# Patient Record
Sex: MALE | Race: WHITE | NOT HISPANIC OR LATINO | Employment: OTHER | URBAN - METROPOLITAN AREA
[De-identification: names, ages, dates, MRNs, and addresses within clinical notes are randomized per-mention and may not be internally consistent; named-entity substitution may affect disease eponyms.]

---

## 2018-03-26 ENCOUNTER — OFFICE VISIT CONVERTED (OUTPATIENT)
Dept: FAMILY MEDICINE CLINIC | Facility: CLINIC | Age: 68
End: 2018-03-26
Attending: FAMILY MEDICINE

## 2018-09-25 ENCOUNTER — OFFICE VISIT CONVERTED (OUTPATIENT)
Dept: FAMILY MEDICINE CLINIC | Facility: CLINIC | Age: 68
End: 2018-09-25
Attending: FAMILY MEDICINE

## 2019-04-30 ENCOUNTER — OFFICE VISIT CONVERTED (OUTPATIENT)
Dept: FAMILY MEDICINE CLINIC | Facility: CLINIC | Age: 69
End: 2019-04-30
Attending: FAMILY MEDICINE

## 2019-04-30 ENCOUNTER — HOSPITAL ENCOUNTER (OUTPATIENT)
Dept: LAB | Facility: HOSPITAL | Age: 69
Discharge: HOME | End: 2019-04-30
Attending: FAMILY MEDICINE

## 2019-04-30 LAB
ANION GAP SERPL CALC-SCNC: 20 MMOL/L (ref 8–19)
BUN SERPL-MCNC: 22 MG/DL (ref 5–25)
BUN/CREAT SERPL: 22 {RATIO} (ref 6–20)
CALCIUM SERPL-MCNC: 9.5 MG/DL (ref 8.7–10.4)
CHLORIDE SERPL-SCNC: 102 MMOL/L (ref 99–111)
CHOLEST SERPL-MCNC: 182 MG/DL (ref 107–200)
CHOLEST/HDLC SERPL: 4.3 {RATIO} (ref 3–6)
CONV CO2: 23 MMOL/L (ref 22–32)
CREAT UR-MCNC: 1.01 MG/DL (ref 0.7–1.2)
GFR SERPLBLD BASED ON 1.73 SQ M-ARVRAT: >60 ML/MIN/{1.73_M2}
GLUCOSE SERPL-MCNC: 112 MG/DL (ref 70–99)
HDLC SERPL-MCNC: 42 MG/DL (ref 40–60)
LDLC SERPL CALC-MCNC: 114 MG/DL (ref 70–100)
OSMOLALITY SERPL CALC.SUM OF ELEC: 296 MOSM/KG (ref 273–304)
POTASSIUM SERPL-SCNC: 4.3 MMOL/L (ref 3.5–5.3)
SODIUM SERPL-SCNC: 141 MMOL/L (ref 135–147)
TRIGL SERPL-MCNC: 131 MG/DL (ref 40–150)
URATE SERPL-MCNC: 7.1 MG/DL (ref 3.5–8.5)
VLDLC SERPL-MCNC: 26 MG/DL (ref 5–37)

## 2019-05-24 ENCOUNTER — OFFICE VISIT CONVERTED (OUTPATIENT)
Dept: ORTHOPEDIC SURGERY | Facility: CLINIC | Age: 69
End: 2019-05-24
Attending: ORTHOPAEDIC SURGERY

## 2019-09-30 ENCOUNTER — OFFICE VISIT CONVERTED (OUTPATIENT)
Dept: FAMILY MEDICINE CLINIC | Facility: CLINIC | Age: 69
End: 2019-09-30
Attending: FAMILY MEDICINE

## 2019-09-30 ENCOUNTER — HOSPITAL ENCOUNTER (OUTPATIENT)
Dept: LAB | Facility: HOSPITAL | Age: 69
Discharge: HOME | End: 2019-09-30
Attending: FAMILY MEDICINE

## 2019-09-30 LAB
ANION GAP SERPL CALC-SCNC: 26 MMOL/L (ref 8–19)
BUN SERPL-MCNC: 15 MG/DL (ref 5–25)
BUN/CREAT SERPL: 15 {RATIO} (ref 6–20)
CALCIUM SERPL-MCNC: 10.2 MG/DL (ref 8.7–10.4)
CHLORIDE SERPL-SCNC: 101 MMOL/L (ref 99–111)
CHOLEST SERPL-MCNC: 202 MG/DL (ref 107–200)
CHOLEST/HDLC SERPL: 4.2 {RATIO} (ref 3–6)
CONV CO2: 19 MMOL/L (ref 22–32)
CREAT UR-MCNC: 1 MG/DL (ref 0.7–1.2)
GFR SERPLBLD BASED ON 1.73 SQ M-ARVRAT: >60 ML/MIN/{1.73_M2}
GLUCOSE SERPL-MCNC: 86 MG/DL (ref 70–99)
HDLC SERPL-MCNC: 48 MG/DL (ref 40–60)
LDLC SERPL CALC-MCNC: 127 MG/DL (ref 70–100)
OSMOLALITY SERPL CALC.SUM OF ELEC: 294 MOSM/KG (ref 273–304)
POTASSIUM SERPL-SCNC: 4.3 MMOL/L (ref 3.5–5.3)
PSA SERPL-MCNC: 0.83 NG/ML (ref 0–4)
SODIUM SERPL-SCNC: 142 MMOL/L (ref 135–147)
TRIGL SERPL-MCNC: 136 MG/DL (ref 40–150)
VLDLC SERPL-MCNC: 27 MG/DL (ref 5–37)

## 2020-04-21 ENCOUNTER — TELEMEDICINE CONVERTED (OUTPATIENT)
Dept: FAMILY MEDICINE CLINIC | Facility: CLINIC | Age: 70
End: 2020-04-21
Attending: INTERNAL MEDICINE

## 2020-05-19 ENCOUNTER — HOSPITAL ENCOUNTER (OUTPATIENT)
Dept: LAB | Facility: HOSPITAL | Age: 70
Discharge: HOME | End: 2020-05-19
Attending: INTERNAL MEDICINE

## 2020-05-19 LAB
ALBUMIN SERPL-MCNC: 4.5 G/DL (ref 3.5–5)
ALBUMIN/GLOB SERPL: 1.9 {RATIO} (ref 1.4–2.6)
ALP SERPL-CCNC: 57 U/L (ref 56–155)
ALT SERPL-CCNC: 26 U/L (ref 10–40)
ANION GAP SERPL CALC-SCNC: 20 MMOL/L (ref 8–19)
AST SERPL-CCNC: 20 U/L (ref 15–50)
BASOPHILS # BLD AUTO: 0.05 10*3/UL (ref 0–0.2)
BASOPHILS NFR BLD AUTO: 0.6 % (ref 0–3)
BILIRUB SERPL-MCNC: 0.48 MG/DL (ref 0.2–1.3)
BUN SERPL-MCNC: 15 MG/DL (ref 5–25)
BUN/CREAT SERPL: 19 {RATIO} (ref 6–20)
CALCIUM SERPL-MCNC: 9.7 MG/DL (ref 8.7–10.4)
CHLORIDE SERPL-SCNC: 101 MMOL/L (ref 99–111)
CHOLEST SERPL-MCNC: 182 MG/DL (ref 107–200)
CHOLEST/HDLC SERPL: 4.6 {RATIO} (ref 3–6)
CONV ABS IMM GRAN: 0.05 10*3/UL (ref 0–0.2)
CONV CO2: 23 MMOL/L (ref 22–32)
CONV IMMATURE GRAN: 0.6 % (ref 0–1.8)
CONV TOTAL PROTEIN: 6.9 G/DL (ref 6.3–8.2)
CREAT UR-MCNC: 0.78 MG/DL (ref 0.7–1.2)
DEPRECATED RDW RBC AUTO: 42.1 FL (ref 35.1–43.9)
EOSINOPHIL # BLD AUTO: 0.07 10*3/UL (ref 0–0.7)
EOSINOPHIL # BLD AUTO: 0.9 % (ref 0–7)
ERYTHROCYTE [DISTWIDTH] IN BLOOD BY AUTOMATED COUNT: 13.1 % (ref 11.6–14.4)
GFR SERPLBLD BASED ON 1.73 SQ M-ARVRAT: >60 ML/MIN/{1.73_M2}
GLOBULIN UR ELPH-MCNC: 2.4 G/DL (ref 2–3.5)
GLUCOSE SERPL-MCNC: 96 MG/DL (ref 70–99)
HCT VFR BLD AUTO: 44.2 % (ref 42–52)
HDLC SERPL-MCNC: 40 MG/DL (ref 40–60)
HGB BLD-MCNC: 14.6 G/DL (ref 14–18)
LDLC SERPL CALC-MCNC: 110 MG/DL (ref 70–100)
LYMPHOCYTES # BLD AUTO: 2.03 10*3/UL (ref 1–5)
LYMPHOCYTES NFR BLD AUTO: 24.7 % (ref 20–45)
MCH RBC QN AUTO: 29.1 PG (ref 27–31)
MCHC RBC AUTO-ENTMCNC: 33 G/DL (ref 33–37)
MCV RBC AUTO: 88 FL (ref 80–96)
MONOCYTES # BLD AUTO: 0.76 10*3/UL (ref 0.2–1.2)
MONOCYTES NFR BLD AUTO: 9.3 % (ref 3–10)
NEUTROPHILS # BLD AUTO: 5.25 10*3/UL (ref 2–8)
NEUTROPHILS NFR BLD AUTO: 63.9 % (ref 30–85)
NRBC CBCN: 0 % (ref 0–0.7)
OSMOLALITY SERPL CALC.SUM OF ELEC: 289 MOSM/KG (ref 273–304)
PLATELET # BLD AUTO: 238 10*3/UL (ref 130–400)
PMV BLD AUTO: 11.5 FL (ref 9.4–12.4)
POTASSIUM SERPL-SCNC: 4.5 MMOL/L (ref 3.5–5.3)
PSA SERPL-MCNC: 0.6 NG/ML (ref 0–4)
RBC # BLD AUTO: 5.02 10*6/UL (ref 4.7–6.1)
SODIUM SERPL-SCNC: 139 MMOL/L (ref 135–147)
TRIGL SERPL-MCNC: 159 MG/DL (ref 40–150)
VLDLC SERPL-MCNC: 32 MG/DL (ref 5–37)
WBC # BLD AUTO: 8.21 10*3/UL (ref 4.8–10.8)

## 2020-05-27 ENCOUNTER — OFFICE VISIT CONVERTED (OUTPATIENT)
Dept: SURGERY | Facility: CLINIC | Age: 70
End: 2020-05-27
Attending: NURSE PRACTITIONER

## 2020-05-27 ENCOUNTER — CONVERSION ENCOUNTER (OUTPATIENT)
Dept: SURGERY | Facility: CLINIC | Age: 70
End: 2020-05-27

## 2020-09-08 ENCOUNTER — HOSPITAL ENCOUNTER (OUTPATIENT)
Dept: PREADMISSION TESTING | Facility: HOSPITAL | Age: 70
Discharge: HOME | End: 2020-09-08
Attending: SURGERY

## 2020-09-10 LAB — SARS-COV-2 RNA SPEC QL NAA+PROBE: NOT DETECTED

## 2020-09-11 ENCOUNTER — HOSPITAL ENCOUNTER (OUTPATIENT)
Dept: GASTROENTEROLOGY | Facility: HOSPITAL | Age: 70
Setting detail: HOSPITAL OUTPATIENT SURGERY
Discharge: HOME | End: 2020-09-11
Attending: SURGERY

## 2020-09-24 ENCOUNTER — OFFICE VISIT CONVERTED (OUTPATIENT)
Dept: SURGERY | Facility: CLINIC | Age: 70
End: 2020-09-24
Attending: SURGERY

## 2020-10-21 ENCOUNTER — HOSPITAL ENCOUNTER (OUTPATIENT)
Dept: LAB | Facility: HOSPITAL | Age: 70
Discharge: HOME | End: 2020-10-21
Attending: INTERNAL MEDICINE

## 2020-10-21 ENCOUNTER — CONVERSION ENCOUNTER (OUTPATIENT)
Dept: FAMILY MEDICINE CLINIC | Facility: CLINIC | Age: 70
End: 2020-10-21

## 2020-10-21 ENCOUNTER — OFFICE VISIT CONVERTED (OUTPATIENT)
Dept: FAMILY MEDICINE CLINIC | Facility: CLINIC | Age: 70
End: 2020-10-21
Attending: INTERNAL MEDICINE

## 2020-10-21 LAB
ALBUMIN SERPL-MCNC: 4.6 G/DL (ref 3.5–5)
ALBUMIN/GLOB SERPL: 1.7 {RATIO} (ref 1.4–2.6)
ALP SERPL-CCNC: 60 U/L (ref 56–155)
ALT SERPL-CCNC: 31 U/L (ref 10–40)
ANION GAP SERPL CALC-SCNC: 21 MMOL/L (ref 8–19)
AST SERPL-CCNC: 23 U/L (ref 15–50)
BILIRUB SERPL-MCNC: 0.51 MG/DL (ref 0.2–1.3)
BUN SERPL-MCNC: 17 MG/DL (ref 5–25)
BUN/CREAT SERPL: 19 {RATIO} (ref 6–20)
CALCIUM SERPL-MCNC: 9.9 MG/DL (ref 8.7–10.4)
CHLORIDE SERPL-SCNC: 101 MMOL/L (ref 99–111)
CHOLEST SERPL-MCNC: 187 MG/DL (ref 107–200)
CHOLEST/HDLC SERPL: 4 {RATIO} (ref 3–6)
CONV CO2: 22 MMOL/L (ref 22–32)
CONV TOTAL PROTEIN: 7.3 G/DL (ref 6.3–8.2)
CREAT UR-MCNC: 0.91 MG/DL (ref 0.7–1.2)
EST. AVERAGE GLUCOSE BLD GHB EST-MCNC: 105 MG/DL
GFR SERPLBLD BASED ON 1.73 SQ M-ARVRAT: >60 ML/MIN/{1.73_M2}
GLOBULIN UR ELPH-MCNC: 2.7 G/DL (ref 2–3.5)
GLUCOSE SERPL-MCNC: 90 MG/DL (ref 70–99)
HBA1C MFR BLD: 5.3 % (ref 3.5–5.7)
HDLC SERPL-MCNC: 47 MG/DL (ref 40–60)
LDLC SERPL CALC-MCNC: 111 MG/DL (ref 70–100)
OSMOLALITY SERPL CALC.SUM OF ELEC: 291 MOSM/KG (ref 273–304)
POTASSIUM SERPL-SCNC: 4.4 MMOL/L (ref 3.5–5.3)
PSA SERPL-MCNC: 0.73 NG/ML (ref 0–4)
SODIUM SERPL-SCNC: 140 MMOL/L (ref 135–147)
TRIGL SERPL-MCNC: 147 MG/DL (ref 40–150)
VLDLC SERPL-MCNC: 29 MG/DL (ref 5–37)

## 2021-05-04 ENCOUNTER — HOSPITAL ENCOUNTER (OUTPATIENT)
Dept: LAB | Facility: HOSPITAL | Age: 71
Discharge: HOME | End: 2021-05-04
Attending: PHYSICIAN ASSISTANT

## 2021-05-04 ENCOUNTER — OFFICE VISIT CONVERTED (OUTPATIENT)
Dept: FAMILY MEDICINE CLINIC | Facility: CLINIC | Age: 71
End: 2021-05-04
Attending: PHYSICIAN ASSISTANT

## 2021-05-04 LAB
25(OH)D3 SERPL-MCNC: 45 NG/ML (ref 30–100)
ALBUMIN SERPL-MCNC: 4.5 G/DL (ref 3.5–5)
ALBUMIN/GLOB SERPL: 1.6 {RATIO} (ref 1.4–2.6)
ALP SERPL-CCNC: 54 U/L (ref 56–155)
ALT SERPL-CCNC: 26 U/L (ref 10–40)
ANION GAP SERPL CALC-SCNC: 17 MMOL/L (ref 8–19)
APPEARANCE UR: CLEAR
AST SERPL-CCNC: 24 U/L (ref 15–50)
BASOPHILS # BLD AUTO: 0.05 10*3/UL (ref 0–0.2)
BASOPHILS NFR BLD AUTO: 0.6 % (ref 0–3)
BILIRUB SERPL-MCNC: 0.46 MG/DL (ref 0.2–1.3)
BILIRUB UR QL: NEGATIVE
BUN SERPL-MCNC: 21 MG/DL (ref 5–25)
BUN/CREAT SERPL: 23 {RATIO} (ref 6–20)
CALCIUM SERPL-MCNC: 9.3 MG/DL (ref 8.7–10.4)
CHLORIDE SERPL-SCNC: 100 MMOL/L (ref 99–111)
CHOLEST SERPL-MCNC: 195 MG/DL (ref 107–200)
CHOLEST/HDLC SERPL: 4.4 {RATIO} (ref 3–6)
COLOR UR: YELLOW
CONV ABS IMM GRAN: 0.04 10*3/UL (ref 0–0.2)
CONV CO2: 24 MMOL/L (ref 22–32)
CONV COLLECTION SOURCE (UA): NORMAL
CONV IMMATURE GRAN: 0.5 % (ref 0–1.8)
CONV TOTAL PROTEIN: 7.3 G/DL (ref 6.3–8.2)
CONV UROBILINOGEN IN URINE BY AUTOMATED TEST STRIP: 0.2 {EHRLICHU}/DL (ref 0.1–1)
CREAT UR-MCNC: 0.9 MG/DL (ref 0.7–1.2)
DEPRECATED RDW RBC AUTO: 42.3 FL (ref 35.1–43.9)
EOSINOPHIL # BLD AUTO: 0.12 10*3/UL (ref 0–0.7)
EOSINOPHIL # BLD AUTO: 1.5 % (ref 0–7)
ERYTHROCYTE [DISTWIDTH] IN BLOOD BY AUTOMATED COUNT: 13.4 % (ref 11.6–14.4)
GFR SERPLBLD BASED ON 1.73 SQ M-ARVRAT: >60 ML/MIN/{1.73_M2}
GLOBULIN UR ELPH-MCNC: 2.8 G/DL (ref 2–3.5)
GLUCOSE SERPL-MCNC: 98 MG/DL (ref 70–99)
GLUCOSE UR QL: NEGATIVE MG/DL
HCT VFR BLD AUTO: 43 % (ref 42–52)
HDLC SERPL-MCNC: 44 MG/DL (ref 40–60)
HGB BLD-MCNC: 14.3 G/DL (ref 14–18)
HGB UR QL STRIP: NEGATIVE
KETONES UR QL STRIP: NEGATIVE MG/DL
LDLC SERPL CALC-MCNC: 118 MG/DL (ref 70–100)
LEUKOCYTE ESTERASE UR QL STRIP: NEGATIVE
LYMPHOCYTES # BLD AUTO: 1.9 10*3/UL (ref 1–5)
LYMPHOCYTES NFR BLD AUTO: 23.6 % (ref 20–45)
MCH RBC QN AUTO: 28.8 PG (ref 27–31)
MCHC RBC AUTO-ENTMCNC: 33.3 G/DL (ref 33–37)
MCV RBC AUTO: 86.5 FL (ref 80–96)
MONOCYTES # BLD AUTO: 0.83 10*3/UL (ref 0.2–1.2)
MONOCYTES NFR BLD AUTO: 10.3 % (ref 3–10)
NEUTROPHILS # BLD AUTO: 5.12 10*3/UL (ref 2–8)
NEUTROPHILS NFR BLD AUTO: 63.5 % (ref 30–85)
NITRITE UR QL STRIP: NEGATIVE
NRBC CBCN: 0 % (ref 0–0.7)
OSMOLALITY SERPL CALC.SUM OF ELEC: 287 MOSM/KG (ref 273–304)
PH UR STRIP.AUTO: 7 [PH] (ref 5–8)
PLATELET # BLD AUTO: 223 10*3/UL (ref 130–400)
PMV BLD AUTO: 11.7 FL (ref 9.4–12.4)
POTASSIUM SERPL-SCNC: 4 MMOL/L (ref 3.5–5.3)
PROT UR QL: NEGATIVE MG/DL
RBC # BLD AUTO: 4.97 10*6/UL (ref 4.7–6.1)
SODIUM SERPL-SCNC: 137 MMOL/L (ref 135–147)
SP GR UR: 1.02 (ref 1–1.03)
T4 FREE SERPL-MCNC: 1.2 NG/DL (ref 0.9–1.8)
TRIGL SERPL-MCNC: 167 MG/DL (ref 40–150)
TSH SERPL-ACNC: 1.99 M[IU]/L (ref 0.27–4.2)
URATE SERPL-MCNC: 7.3 MG/DL (ref 3.5–8.5)
VLDLC SERPL-MCNC: 33 MG/DL (ref 5–37)
WBC # BLD AUTO: 8.06 10*3/UL (ref 4.8–10.8)

## 2021-05-10 NOTE — H&P
History and Physical      Patient Name: Jose Raul Traylor   Patient ID: [id number]   Sex: Male   YOB: 1950    Primary Care Provider: Thanh Ford DO   Referring Provider: Burak Schwartz MD    Visit Date: May 27, 2020    Provider: CURTIS Cruz   Location[facility]   Location Address: [address] [place] [address]  [id number]   Location Phone: [phone]          Chief Complaint  · Requesting colonoscopy  · Age 50 or over  · Here today for a pre-surgical colon screening visit  · Personal History of Polyps      History Of Present Illness  The patient is a 69 year old /White male presenting to the [facility] office on a referral from Burak Schwartz MD.   Jose Raul Traylor needs to have a screening colonoscopy.   Patient states that they have had a colonoscopy. 6 years ago   Patient currently complains of: no complaints and   Patient Does not have family history of colon cancer.      Patient presents today on referral from Dr. Manolo Jimenez.  Patient presents today without complaints for screening colonoscopy.  He denies any abdominal pain, diarrhea, or rectal bleeding.  Admits to a history of colonic polyps.  Denies any family history of colorectal cancer.    7/2014 - Colonoscopy ([facility]): Diverticulosis and hemorrhoids.     9/2010 - Colonoscopy ([facility]): @20cm - Serrated hyperplastic; diverticulosis.    12/2002 - Colonoscopy ([facility]): Rectum - Hyperplastic.         Past Medical History  Disease Name Date Onset Notes   Allergic rhinitis, chronic --  --    Allergy, Unspecified --  --    BPH without urinary obstruction --  --    Degenerative disc disease, lumbar 10/05/2016 --    GERD --  --    Gout 09/08/2015 --    High blood pressure --  --    Hypertension --  --    Seasonal allergies --  --    Vitamin D deficiency 09/08/2015 --          Past Surgical History  Procedure Name Date Notes   Colonoscopy 07/2014 Diverticulosis and hemorrhoids.         Medication List  Name Date  Started Instructions   allopurinol 100 mg oral tablet 09/30/2019 TAKE ONE TABLET BY MOUTH DAILY   amlodipine 10 mg oral tablet 09/30/2019 TAKE ONE TABLET BY MOUTH DAILY for 90 days   aspirin 81 mg oral tablet,delayed release (DR/EC)  take 1 tablet (81 mg) by oral route once daily   doxazosin 8 mg oral tablet 09/30/2019 take 1 tablet (8 mg) by oral route once daily for 90 days   hydrochlorothiazide 25 mg oral tablet 09/30/2019 take 1 tablet (25 mg) by oral route once daily   losartan 50 mg oral tablet 09/30/2019 take 1 tablet (50 mg) by oral route once daily   meloxicam 15 mg oral tablet 09/30/2019 take 1 tablet (15 mg) by oral route once daily   omeprazole 20 mg oral capsule,delayed release(DR/EC) 09/30/2019 take 1 capsule (20 mg) by oral route once daily before a meal for 90 days   Singulair 10 mg oral tablet 09/30/2019 TAKE ONE TABLET BY MOUTH EVERY EVENING   Vitamin D3 2,000 unit oral capsule  take 1 capsule by oral route daily   zinc 50 mg oral tablet  --    Zyrtec 10 mg oral tablet 09/30/2019 TAKE ONE TABLET BY MOUTH DAILY         Allergy List  Allergen Name Date Reaction Notes   NO KNOWN DRUG ALLERGIES --  --  --    Molds --  --  --          Family Medical History  Disease Name Relative/Age Notes   Cancer, Unspecified Son/   Son   Renal Calculus Mother/   Mother         Social History  Finding Status Start/Stop Quantity Notes   Active but no formal exercise --  --/-- --  --    Alcohol Current some day --/-- --  04/30/2019 - 22   Alcohol Use Current some day --/-- --  rarely drinks, less than 1 drink per day, has been drinking for 31 or more years   lives with spouse --  --/-- --  --     --  --/-- --  --    . --  --/-- --  --    No known infection risk --  --/-- --  --    Recreational Drug Use Never --/-- --  no   Retired. --  --/-- --  --    Second hand smoke exposure Never 0/0 --  no   Tobacco Former 15/45 1 pack former smoker         Review of Systems  · Constitutional  o Denies  o : fever,  "chills  · Eyes  o Denies  o : yellowish discoloration of eyes  · HENT  o Denies  o : difficulty swallowing  · Cardiovascular  o Denies  o : chest pain, chest pain on exertion  · Respiratory  o Denies  o : shortness of breath  · Gastrointestinal  o Denies  o : nausea, vomiting, diarrhea, constipation  · Genitourinary  o Denies  o : abnormal color of urine  · Integument  o Denies  o : rash  · Neurologic  o Denies  o : tingling or numbness  · Musculoskeletal  o Denies  o : joint pain  · Endocrine  o Denies  o : weight gain, weight loss      Vitals  Date Time BP Position Site L\R Cuff Size HR RR TEMP (F) WT  HT  BMI kg/m2 BSA m2 O2 Sat        05/27/2020 09:07 AM       14  221lbs 6oz 5'  8\" 33.66 2.19           Physical Examination  · Constitutional  o Appearance  o : well developed, well-nourished, patient in no apparent distress  · Head and Face  o Head  o :   § Inspection  § : atraumatic, normocephalic  o Face  o :   § Inspection  § : no facial lesions  · Eyes  o Conjunctivae  o : conjunctivae normal  o Sclerae  o : sclerae white  · Neck  o Inspection/Palpation  o : normal appearance, no masses or tenderness, trachea midline  · Respiratory  o Respiratory Effort  o : breathing unlabored  · Skin and Subcutaneous Tissue  o General Inspection  o : no lesions present, no areas of discoloration, skin turgor normal, texture normal  · Neurologic  o Mental Status Examination  o :   § Orientation  § : grossly oriented to person, place and time  § Attention  § : attention normal, concentration abilities normal  § Fund of Knowledge  § : fund of knowledge within normal limits, patient aware of current events  o Gait and Station  o : normal gait, able to stand without difficulty  · Psychiatric  o Judgement and Insight  o : judgment and insight intact  o Mood and Affect  o : mood normal, affect appropriate     Abdomen: Round, soft, nondistended, nontender, no guarding, no rebound tenderness noted.           Assessment  · Personal " history of colonic polyps     V12.72/Z86.010  · Screening for colon cancer     V76.51/Z12.11  · Pre-op testing     V72.84/Z01.818      Plan  · Orders  o Consent for Colonoscopy Screening-Possible risk/complications, benefits, and alternatives to surgical or invasive procedure have been explained to patient and/or legal guardian. -Patient has been evaluated and can tolerate anesthesia and/or sedation. Risks, benefits, and alternatives to anesthesia and sedation have been explained to patient and/or legal guardian. () - V12.72/Z86.010, V76.51/Z12.11 - 09/11/2020  o [facility] Pre-Op Covid-19 Screening (46646) - V12.72/Z86.010, V76.51/Z12.11, V72.84/Z01.818 - 09/08/2020  · Medications  o Medications have been Reconciled  o Transition of Care or Provider Policy  · Instructions  o Surgical Facility: [facility]  o Handouts Provided Pre-Procedure Instructions including date, time, and location of procedure.   o PLAN: Proceeed with colonoscopy. Patient understands risks/benefits and is willing to proceed.   o ***Surgical Orders***  o RISK AND BENEFITS:  o Given these options, the patient has verbally expressed an understanding of the risks of the surgery and finds these risks acceptable. Will proceed with surgery as soon as possible.  o O.R. PREP: Per protocol   o IV: Per Anesthesia  o Please sign permit for: Colonoscopy with possible biopsies by Dr. Chairez.  o The above History and Physical Examination has been completed within 30 days of admission.  o ***Patient Status***  o Outpatient  o Follow up in the in the office post procedure.  o Electronically Identified Patient Education Materials Provided Electronically            Electronically Signed by: CURTIS Cruz -Author on May 27, 2020 01:17:01 PM

## 2021-05-12 NOTE — PROGRESS NOTES
Progress Note      Patient Name: Jose Raul Traylor   Patient ID: [id number]   Sex: Male   YOB: 1950    Primary Care Provider: Thanh Ford DO    Visit Date: April 21, 2020    Provider: Thanh Ford DO   Location: [facility]   Location Address: [address] [place] [address], [address]  [place], [place]  [id number]   Location Phone: [phone]          Chief Complaint  · Follow up      History Of Present Illness  Video Conferencing Visit  Jose Raul Traylor is a 69 year old /White male who is presenting for evaluation via video conferencing. Verbal consent obtained before beginning visit.   The following staff were present during this visit: Nely Green/ Thanh Ford DO      Follow up     Pt states that his BS has been running around . Pt states that he tried to take Vitamin C but it wasn't doing any thing for his BS so he stopped.    Pt states that he has fallen about 4 weeks ago, he missed a step coming back from [place] with his boat. Pt states that he did not go to the hospital or anything. He states that he hurt his self pretty bad from knee to waist. Pt states that the first week he put ice on it and the second week he put heat on it.    Pt also states that his BP has been around 138/65, 130/67 and his heartrate around high 40s and 50s.    No medication refills at this time.       Past Medical History  Disease Name Date Onset Notes   Allergic rhinitis, chronic --  --    Allergy, Unspecified --  --    BPH without urinary obstruction --  --    Degenerative disc disease, lumbar 10/05/2016 --    GERD --  --    Gout 09/08/2015 --    High blood pressure --  --    Hypertension --  --    Seasonal allergies --  --    Vitamin D deficiency 09/08/2015 --          Past Surgical History  Procedure Name Date Notes   Colonoscopy 07/2014 Diverticulosis and hemorrhoids.         Medication List  Name Date Started Instructions   allopurinol 100 mg oral tablet 09/30/2019 TAKE ONE TABLET BY MOUTH DAILY    amlodipine 10 mg oral tablet 09/30/2019 TAKE ONE TABLET BY MOUTH DAILY for 90 days   aspirin 81 mg oral tablet,delayed release (DR/EC)  take 1 tablet (81 mg) by oral route once daily   doxazosin 8 mg oral tablet 09/30/2019 take 1 tablet (8 mg) by oral route once daily for 90 days   hydrochlorothiazide 25 mg oral tablet 09/30/2019 take 1 tablet (25 mg) by oral route once daily   losartan 50 mg oral tablet 09/30/2019 take 1 tablet (50 mg) by oral route once daily   meloxicam 15 mg oral tablet 09/30/2019 take 1 tablet (15 mg) by oral route once daily   omeprazole 20 mg oral capsule,delayed release(DR/EC) 09/30/2019 take 1 capsule (20 mg) by oral route once daily before a meal for 90 days   Singulair 10 mg oral tablet 09/30/2019 TAKE ONE TABLET BY MOUTH EVERY EVENING   Vitamin D3 2,000 unit oral capsule  take 1 capsule by oral route daily   Zyrtec 10 mg oral tablet 09/30/2019 TAKE ONE TABLET BY MOUTH DAILY         Allergy List  Allergen Name Date Reaction Notes   NO KNOWN DRUG ALLERGIES --  --  --    Molds --  --  --          Family Medical History  Disease Name Relative/Age Notes   Cancer, Unspecified Son/   Son   Renal Calculus Mother/   Mother         Social History  Finding Status Start/Stop Quantity Notes   Active but no formal exercise --  --/-- --  --    Alcohol Current some day --/-- --  04/30/2019 - 22   Alcohol Use Current some day --/-- --  rarely drinks, less than 1 drink per day, has been drinking for 31 or more years   lives with spouse --  --/-- --  --     --  --/-- --  --    . --  --/-- --  --    No known infection risk --  --/-- --  --    Recreational Drug Use Never --/-- --  no   Retired. --  --/-- --  --    Second hand smoke exposure Never 0/0 --  no   Tobacco Former 15/45 1 pack former smoker         Immunizations  NameDate Admin Mfg Trade Name Lot Number Route Inj VIS Given VIS Publication   Xxskmnqjy14/23/2019 [facility] FLUZONE-HIGH DOSE [id number] IM RD 10/23/2019    Comments: tolerated  well advised to wait for 15 minutes after injection   Fvkmeydxg79/03/2018 [facility] Fluzone Quadrivalent [id number] IM RD 10/03/2018 08/07/2015   Comments: Injection given. Pt tolerated well.   Nctnodbmd52/13/2017 [facility] Fluarix, quadrivalent, preservative free [id number] IM RD 10/13/2017 08/07/2015   Comments: tolerated well   Tnldglxph64/05/2016 [facility] Fluarix, quadrivalent, preservative free [id number] IM RD 10/05/2016 08/07/2015   Comments: tolerated well   Ekutzdtob98/20/2015 [facility] Fluarix, quadrivalent, preservative free [id number] IM LD 10/20/2015 08/07/2015   Comments:    Tucqdrkib26/01/2014 [facility] Fluarix, quadrivalent, preservative free [id number] NE NE 09/04/2015 07/02/2012   Comments:    Prevnar 1310/05/2016 [facility] PREVNAR 13 [id number] IM LD 10/05/2016 11/15/2015   Comments: tolerated well   Tdap07/01/2015 [facility] BOOSTRIX  NE NE 09/04/2015 01/24/2012   Comments:          Review of Systems  · Constitutional  o Denies  o : fatigue, night sweats  · Eyes  o Denies  o : double vision, blurred vision  · HENT  o Denies  o : vertigo, recent head injury  · Cardiovascular  o Denies  o : chest pain, irregular heart beats  · Respiratory  o Denies  o : shortness of breath, productive cough  · Gastrointestinal  o Denies  o : nausea, vomiting  · Genitourinary  o Denies  o : dysuria, urinary retention  · Integument  o Denies  o : hair growth change, new skin lesions  · Neurologic  o Denies  o : altered mental status, seizures  · Musculoskeletal  o Admits  o : joint pain, hip pain, knee pain, fall four weeks ago  o Denies  o : joint swelling, limitation of motion  · Endocrine  o Denies  o : cold intolerance, heat intolerance  · Psychiatric  o Denies  o : anxiety, depression  · Heme-Lymph  o Denies  o : petechiae, lymph node enlargement or tenderness  · Allergic-Immunologic  o Denies  o : frequent illnesses      Physical Examination  · Constitutional  o Appearance  o : alert, oriented, in no acute distress, well developed, well-nourished  · Eyes  o Vision  o :  Conjuntivae: Normal, Sclerae white, Pupils: PERRL, Cornea: Clear, no lesions bilateral  · Ears, Nose, Mouth and Throat  o Ears  o : Ext. Ears: Normal shape, Non tender, EACs: Normal , Tragus intact bilaterally, Hearing: intact to conversational voice bilaterally  o Nose  o : No nasal discharge, Mucosa: normal, Septum: midline  o Throat  o : Oropharynx: no inflmation or lesions, no purulence or drainage  · Neck  o Inspection/Palpation  o : no masses or tenderness, no deformities, Trachea: Midline, ROM: with in normal limits, no neck stiffness  o Thyroid  o : no thyromegaly  · Respiratory  o Auscultation of Lungs  o : normal RR, no labored breathing  · Skin and Subcutaneous Tissue  o General Inspection  o : no rashes on visible skin, normal skin color, warm and dry  o Digits and Nails  o : no clubbing, cyanosis, deformities or edema present, normal appearing nails  · Neurologic  o Mental Status Examination  o : alert and oriented to time, place, and person. Gait and Station: normal gait, able to stand without difficulty. CN 2-12 grossly intact   · Psychiatric  o Judgment and Insight  o : judgment and insight intact  o Mood and Affect  o : normal mood and affect          Assessment  · Degenerative disc disease, lumbar     722.52/M51.36  · Gout     274.9/M10.9  · Allergic rhinitis due to allergen     477.9/J30.9  · High blood pressure     401.9/I10  Patient blood pressure is stable at this time. No changes to current regiment. Okay with him living in 130s systolic but recommended him to let me know if systolic bp starts running in 140s routinely. Patient HR stable but low. Patient asymptomatic from bradycardia. Check CMP and Lipid Panel  · Gastroesophageal reflux disease without esophagitis     530.81/K21.9  · BPH without urinary obstruction     600.00/N40.0  Check PSA  · Fall, initial encounter     E888.9/W19.XXXA  Patient fell four weeks ago but states this week has been able to walk without cane and hip pain and knee  pain are improving. If symptoms worsen, patient will tell us and will pursue imaging.  · Bradycardia     427.89/R00.1  · Obesity (BMI 30-39.9)     278.00/E66.9  · Mixed hyperlipidemia     272.2/E78.2    Problems Reconciled  Plan  · Orders  o ACO-14: Influenza immunization administered or previously received () - - 04/21/2020  o ACO - Advance Care Plan or Surrogate Decision Maker documented in EMR (1123F) - - 04/21/2020  o CMP [facility] ([id number]) - 401.9/I10 - 06/21/2020  o Lipid Panel [facility] ([id number]) - 401.9/I10 - 06/21/2020  o PSA Screening, Ultrasensitive, MEDICARE [facility] () - 600.00/N40.0 - 06/21/2020  o CBC with Auto Diff [facility] ([id number]) - 401.9/I10 - 06/21/2020  · Instructions  o Call or return if new symptoms develop or current ones worsen  o Counseled on proper dieting and weight loss  o Continue to monitor blood pressure  o Take medications as prescribed  o If symptoms of fall worsen, call to let us know so we can do imaging.  o More than 50% of encounter was spent face to face with patient  · Disposition  o Follow up in six months            Electronically Signed by: Thanh Ford DO -Author on April 21, 2020 08:48:12 AM

## 2021-05-13 NOTE — PROGRESS NOTES
Progress Note      Patient Name: Jose Raul Traylor   Patient ID: [id number]   Sex: Male   YOB: 1950    Primary Care Provider: Thanh Ford DO   Referring Provider: Burak Schwartz MD    Visit Date: October 21, 2020    Provider: Thanh Ford DO   Location: [facility] [place]   Location Address: [address] [place] [address], [address]  [place], [place]  [id number]   Location Phone: [phone]          Chief Complaint  · 6 month f/u      History Of Present Illness  Jose Raul Traylor is a 70 year old /White male who presents for evaluation and treatment of:      Pt is here for 6 month f/u.    Pt states that he has no concerns at this time. Patient is tolerating all medications well with no complications. Patient is getting ready to go to [place] for the winter. His parents still live there. His grandkids live in [place].       Past Medical History  Disease Name Date Onset Notes   Allergic rhinitis, chronic --  --    Allergy, Unspecified --  --    BPH without urinary obstruction --  --    Degenerative disc disease, lumbar 10/05/2016 --    GERD --  --    Gout 09/08/2015 --    High blood pressure --  --    Hypertension --  --    Seasonal allergies --  --    Vitamin D deficiency 09/08/2015 --          Past Surgical History  Procedure Name Date Notes   Colonoscopy 07/2014 Diverticulosis and hemorrhoids.         Medication List  Name Date Started Instructions   allopurinol 100 mg oral tablet 09/30/2019 TAKE ONE TABLET BY MOUTH DAILY   amlodipine 10 mg oral tablet 09/30/2019 TAKE ONE TABLET BY MOUTH DAILY for 90 days   aspirin 81 mg oral tablet,delayed release (DR/EC)  take 1 tablet (81 mg) by oral route once daily   doxazosin 8 mg oral tablet 09/30/2019 take 1 tablet (8 mg) by oral route once daily for 90 days   hydrochlorothiazide 25 mg oral tablet 09/30/2019 take 1 tablet (25 mg) by oral route once daily   losartan 50 mg oral tablet 09/30/2019 take 1 tablet (50 mg) by oral route once daily    meloxicam 15 mg oral tablet 09/30/2019 take 1 tablet (15 mg) by oral route once daily   omeprazole 20 mg oral capsule,delayed release(DR/EC) 09/30/2019 take 1 capsule (20 mg) by oral route once daily before a meal for 90 days   Singulair 10 mg oral tablet 09/30/2019 TAKE ONE TABLET BY MOUTH EVERY EVENING   Vitamin D3 2,000 unit oral capsule  take 1 capsule by oral route daily   Zyrtec 10 mg oral tablet 09/30/2019 TAKE ONE TABLET BY MOUTH DAILY         Allergy List  Allergen Name Date Reaction Notes   NO KNOWN DRUG ALLERGIES --  --  --    Molds --  --  --          Family Medical History  Disease Name Relative/Age Notes   Cancer, Unspecified Son/   Son   Renal Calculus Mother/   Mother         Social History  Finding Status Start/Stop Quantity Notes   Active but no formal exercise --  --/-- --  --    Alcohol Current some day --/-- --  04/30/2019 - 22   Alcohol Use Current some day --/-- --  rarely drinks, less than 1 drink per day, has been drinking for 31 or more years   lives with spouse --  --/-- --  --     --  --/-- --  --    . --  --/-- --  --    No known infection risk --  --/-- --  --    Recreational Drug Use Never --/-- --  no   Retired. --  --/-- --  --    Second hand smoke exposure Never 0/0 --  no   Tobacco Former 15/45 1 pack former smoker         Immunizations  NameDate Admin Mfg Trade Name Lot Number Route Inj VIS Given VIS Publication   Coagotysq28/23/2019 [facility] FLUZONE-HIGH DOSE [id number] IM RD 10/23/2019    Comments: tolerated well advised to wait for 15 minutes after injection   Prevnar 1310/05/2016 [facility] PREVNAR 13 [id number] IM LD 10/05/2016 11/15/2015   Comments: tolerated well   Tdap07/01/2015 [facility] BOOSTRIX  NE NE 09/04/2015 01/24/2012   Comments:          Review of Systems  · Constitutional  o Denies  o : fatigue, night sweats  · Eyes  o Denies  o : double vision, blurred vision  · HENT  o Denies  o : vertigo, recent head injury  · Cardiovascular  o Denies  o : chest pain, irregular heart  "beats  · Respiratory  o Denies  o : shortness of breath, productive cough  · Gastrointestinal  o Denies  o : nausea, vomiting  · Genitourinary  o Denies  o : dysuria, urinary retention  · Integument  o Denies  o : hair growth change, new skin lesions  · Neurologic  o Denies  o : altered mental status, seizures  · Musculoskeletal  o Denies  o : joint swelling, limitation of motion  · Endocrine  o Denies  o : cold intolerance, heat intolerance  · Psychiatric  o Denies  o : anxiety, depression  · Heme-Lymph  o Denies  o : petechiae, lymph node enlargement or tenderness  · Allergic-Immunologic  o Denies  o : frequent illnesses      Vitals  Date Time BP Position Site L\R Cuff Size HR RR TEMP (F) WT  HT  BMI kg/m2 BSA m2 O2 Sat FR L/min FiO2 HC       09/24/2020 03:09 PM       12  221lbs 0oz 5'  8\" 33.6 2.19       10/21/2020 08:08 /60 Sitting    48 - R  97.7 224lbs 8oz 5'  8\" 34.13 2.21 97 %  21%          Physical Examination  · Constitutional  o Appearance  o : alert, oriented, in no acute distress, well developed, well-nourished  · Eyes  o Vision  o : Conjuntivae: Normal, Sclerae white, Pupils: PERRL, Cornea: Clear, no lesions bilateral  · Ears, Nose, Mouth and Throat  o Ears  o : Ext. Ears: Normal shape, Non tender, EACs: Normal , Tragus intact bilaterally, Hearing: intact to conversational voice bilaterally  o Nose  o : No nasal discharge, Mucosa: normal, Septum: midline, Sinuses: Nontender  o Throat  o : Oropharynx: no inflmation or lesions, no purulence or drainage  · Neck  o Inspection/Palpation  o : Supple, no masses or tenderness, no deformities, Trachea: Midline, ROM: with in normal limits, no neck stiffness, no lymphadenopathy  o Thyroid  o : no thyomegaly, no palpabale masses   · Respiratory  o Auscultation of Lungs  o : normal breath sounds throughout, no wheeze, rhonchi, or crackles  · Cardiovascular  o Heart  o : Regular rate and rhythm, Normal S1,S2 , No cardiac murmers, No S3 or S4 gallop or " rubs  · Gastrointestinal  o Abdominal Examination  o : abdomen soft, nontender, non distended, no rigidity, gaurding, rebound tenderness, no ventral hernias present  o Liver and spleen  o : no hepatomegaly present, liver nontender to palpation, spleen not palpable  · Skin and Subcutaneous Tissue  o General Inspection  o : no rashes on visible skin, normal skin color, warm and dry  o Digits and Nails  o : no clubbing, cyanosis, deformities or edema present, normal appearing nails  · Neurologic  o Mental Status Examination  o : alert and oriented to time, place, and person. Gait and Station: normal gait, able to stand without difficulty. CN 2-12 grossly intact   · Psychiatric  o Judgment and Insight  o : judgment and insight intact  o Mood and Affect  o : normal mood and affect          Assessment  · Need for influenza vaccination     V04.81/Z23  · Allergic rhinitis due to allergen     477.9/J30.9  · BPH (benign prostatic hyperplasia)     600.00/N40.0  · Essential hypertension     401.9/I10  · Impaired fasting glucose     790.21/R73.01  · Lumbago     724.2/M54.5  · Gout     274.9/M10.9      Plan  · Orders  o Hgb A1c [facility] (00547) - 790.21/R73.01 - 10/21/2020  o ACO - Advance Care Plan or Surrogate Decision Maker documented in EMR (1123F) - - 10/21/2020  o ACO-39: Current medications updated and reviewed (1159F, ) - - 10/21/2020  o HTN/Lipid Panel (CMP, Lipid) [facility] (04169, 65909) - 401.9/I10 - 11/21/2020  · Medications  o zinc 50 mg oral tablet   SIG: ---   DISP: (0) tablet with 0 refills  Discontinued on 10/21/2020     o Medications have been Reconciled  o Transition of Care or Provider Policy  · Instructions  o Patient advised to monitor blood pressure (B/P) at home and journal readings. Patient informed that a B/P reading at home of more than 130/80 is considered hypertension. For readings greater fyze758/90 or higher patient is advised to follow up in the office with readings for management. Patient advised to  limit sodium intake.  o Patient was educated/instructed on their diagnosis, treatment and medications prior to discharge from the clinic today.  o Patient instructed to seek medical attention urgently for new or worsening symptoms.  o Call the office with any concerns or questions.  o Minutes spent with patient including greater than 50% in Education/Counseling/Care Coordination.  o Time spent with the patient was minutes, more than 50% face to face.  o Chronic conditions reviewed and taken into consideration for today's treatment plan.  o Discussed Covid-19 precautions including, but not limited to, social distancing, avoid touching your face, and hand washing.   · Disposition  o Follow up in six months            Electronically Signed by: Thanh Ford DO -Author on October 21, 2020 08:48:02 AM

## 2021-05-13 NOTE — PROGRESS NOTES
Progress Note      Patient Name: Jose Raul Traylor   Patient ID: [id number]   Sex: Male   YOB: 1950    Primary Care Provider: Thanh Ford DO   Referring Provider: Burak Schwartz MD    Visit Date: September 24, 2020    Provider: Sher Chairez MD   Location: [facility]   Location Address: [address] [place] [address]  [id number]   Location Phone: [phone]          Chief Complaint  · Follow Up Surgery      History Of Present Illness     Mr. Traylor is seen in follow-up status post colonoscopy. He was found to have diverticulosis.       Past Medical History  Allergic rhinitis, chronic; Allergy, Unspecified; BPH without urinary obstruction; Degenerative disc disease, lumbar; GERD; Gout; High blood pressure; Hypertension; Seasonal allergies; Vitamin D deficiency         Past Surgical History  Colonoscopy         Medication List  allopurinol 100 mg oral tablet; amlodipine 10 mg oral tablet; aspirin 81 mg oral tablet,delayed release (DR/EC); doxazosin 8 mg oral tablet; hydrochlorothiazide 25 mg oral tablet; losartan 50 mg oral tablet; meloxicam 15 mg oral tablet; omeprazole 20 mg oral capsule,delayed release(DR/EC); Singulair 10 mg oral tablet; Vitamin D3 2,000 unit oral capsule; zinc 50 mg oral tablet; Zyrtec 10 mg oral tablet         Allergy List  NO KNOWN DRUG ALLERGIES; Molds         Family Medical History  Cancer, Unspecified; Renal Calculus         Social History  Active but no formal exercise; Alcohol (Current some day); Alcohol Use (Current some day); lives with spouse; ; .; No known infection risk; Recreational Drug Use (Never); Retired.; Second hand smoke exposure (Never); Tobacco (Former)         Review of Systems  · Cardiovascular  o Denies  o : chest pain on exertion, shortness of breath, lower extremity swelling  · Respiratory  o Denies  o : wheezing, chronic cough, coughing up blood  · Gastrointestinal  o Denies  o : diarrhea, chronic abdominal pain,  "reflux symptoms      Vitals  Date Time BP Position Site L\R Cuff Size HR RR TEMP (F) WT  HT  BMI kg/m2 BSA m2 O2 Sat FR L/min FiO2 HC       09/24/2020 03:09 PM       12  221lbs 0oz 5'  8\" 33.6 2.19                 Assessment  · Encounter for examination following surgery     V67.00/Z09      Plan     I have recommended a high-fiber diet. He should undergo a follow-up colonoscopy in five years.             Electronically Signed by: Verona Espinoza-, -Author on October 4, 2020 11:18:20 AM  Electronically Co-signed by: Sher Chairez MD -Reviewer on October 5, 2020 12:57:31 PM  "

## 2021-05-13 NOTE — PROGRESS NOTES
Progress Note      Patient Name: Jose Raul Traylor   Patient ID: [id number]   Sex: Male   YOB: 1950    Primary Care Provider: Thanh Ford DO   Referring Provider: Burak Schwartz MD    Visit Date: October 21, 2020    Provider: Thanh Ford DO   Location: [facility]   Location Address: [address] [place] [address], [address]  [place], [place]  [id number]   Location Phone: [phone]          Chief Complaint  · Annual Wellness Exam      History Of Present Illness  The patient is a 70 year old /White male who has come to this office for his Annual Wellness Visit.   His Primary Care Provider is Thanh Ford DO. His comprehensive Care Team list, including suppliers, has been updated on the Facesheet. His medical/family history, height, weight, BMI, and blood pressure have been reviewed and are in the chart. The Health Risk Assessment has been completed and scanned in the chart.   Medications are listed in the medication list.   The active problem list includes: Allergic rhinitis, chronic, Allergy, Unspecified, BPH without urinary obstruction, Degenerative disc disease, lumbar, GERD, Gout, High blood pressure, and Vitamin D deficiency   The patient does not have a history of substance use.   Patient reports his diet is adequate.   The Mini-Cog has been administered and is scanned in chart. The results are negative. His cognitive function is without limitation.   A hearing loss screen was completed today and the result is negative.   Patient does not have any risk factors for depression. Patient completed the PHQ-9 today and it has been scanned in the chart. The total score is 1-4.   The Timed Up and Go screen was administered today and the result is negative.   The Miner Index of [place] in ADLs indicated full function (score of 6).   A Falls Risk Assessment has been completed, including a review of home fall hazards and medication review.   Overall, the patient's  "functional ability is noted by this provider to be within normal limits. His level of safety is noted to be within normal limits. His balance/gait is within normal limits. There has been a fall with injury in the past year. Patient-specific home safety recommendations have been reviewed and a copy has been given to patient.   He denies issues with leaking urine.   There are no additional risk factors identified.   Living Will/Advanced Directive previously executed and scanned in chart.   Personalized health advice was given to the patient and a written health screening schedule was established; see Plan for details.       Vitals  Date Time BP Position Site L\R Cuff Size HR RR TEMP (F) WT  HT  BMI kg/m2 BSA m2 O2 Sat FR L/min FiO2 HC       10/21/2020 08:08 /60 Sitting    48 - R  97.7 224lbs 8oz 5'  8\" 34.13 2.21 97 %  21%    10/21/2020 08:23 /65 Sitting                           Assessment  · Screening for alcoholism     V79.1/Z13.89  · Screening for depression     V79.0/Z13.89  · Screening for prostate cancer     V76.44/Z12.5  · Encounter for Medicare annual wellness exam     V70.0/Z00.00      Plan  · Orders  o Annual alcohol screening using the AUDIT-C tool, 15 minutes [facility] (43519, ) - V79.1/Z13.89 - 10/21/2020  o Brief face-to-face behavioral counseling for alcohol misuse, 15 minutes () - V79.1/Z13.89 - 10/21/2020  o Positive Alcohol Screening () - V79.1/Z13.89 - 10/21/2020  o Annual depression screening using the PHQ-9 tool, 15 minutes (70907, ) - V79.0/Z13.89 - 10/21/2020  o PSA Screening, Ultrasensitive, MEDICARE [facility] () - V76.44/Z12.5 - 10/21/2020  o Falls Risk Assessment Completed (3288F) - V70.0/Z00.00 - 10/21/2020  o Brief hearing screening (written) [facility] () - V70.0/Z00.00 - 10/21/2020  o Annual Wellness Visit-includes a Personalized Prevention Plan of Service (PPS), SUBSEQUENT VISIT (Medicare) () - V70.0/Z00.00 - 10/21/2020  o ACO-13: Fall Risk Screening with no " falls in past year or only one fall without injury in the past year (1101F) - V70.0/Z00.00 - 10/21/2020  o Presence or absence of urinary incontinence assessed (KAYLEN) (1090F) - V70.0/Z00.00 - 10/21/2020  o ACO - Advance Care Plan or Surrogate Decision Maker documented in EMR (1123F) - - 10/21/2020  o ACO-39: Current medications updated and reviewed (, 1159F) - - 10/21/2020  o ACO-19: Colorectal cancer screening results documented and reviewed (3017F) - - 10/21/2020  · Instructions  o Audit-C Questionnaire completed and scanned into the EMR under the designated folder within the patient's documents.  o Audit-C screen 5 or higher which is scored as positive - Brief Intervention and/or referral for specialized treatment  o Depression Screen completed and scanned into the EMR under the designated folder within the patient's documents.  o Today's PHQ-9 result is __2_  o Health Risk Assessment has been reviewed with the patient.  o Written health screening schedule for next 5-10 years was established with patient; information scanned in chart and given/mailed to patient.  o Fall prevention methods discussed and a copy of recommendations given/mailed to patient.            Electronically Signed by: Thanh Ford DO -Author on November 6, 2020 12:15:12 PM

## 2021-05-14 VITALS — RESPIRATION RATE: 12 BRPM | BODY MASS INDEX: 33.49 KG/M2 | HEIGHT: 68 IN | WEIGHT: 221 LBS

## 2021-05-14 VITALS
BODY MASS INDEX: 34.02 KG/M2 | HEART RATE: 48 BPM | WEIGHT: 224.5 LBS | HEIGHT: 68 IN | SYSTOLIC BLOOD PRESSURE: 141 MMHG | OXYGEN SATURATION: 97 % | TEMPERATURE: 97.7 F | DIASTOLIC BLOOD PRESSURE: 60 MMHG

## 2021-05-14 NOTE — PROGRESS NOTES
Progress Note      Patient Name: Jose Raul Traylor   Patient ID: [id number]   Sex: Male   YOB: 1950    Primary Care Provider: Maciej Casas PA-C    Visit Date: May 4, 2021    Provider: Maciej Casas PA-C   Location: [facility] [place]   Location Address: [address] [place] [address], [address]  [place]  [id number]   Location Phone: [phone]          Chief Complaint  · Establish Care/[place] Pt      History Of Present Illness  Jose Raul Traylor is a 70 year old /White male who presents for evaluation and treatment of: establish care      Pt presents today to establish care. He previously saw Dr. Ford.     Pt c/o allergies. He just got back home from [place] for the winter and notes they have been worse this year than normal. Pt notes he has an occasional cough. Pt is taking Zyrtec and Singulair qd.     PMH- GERD, HTN    Labs-10/21/20  Colonoscopy-9/11/20    Pt has received both COVID-19 vaccines in [place], Moderna    HTN - well controlled with losartan HCT, Norvasc  GERD - controlled with omeprazole 20mg    No CP, SOA, HA         Past Medical History  Disease Name Date Onset Notes   Allergic rhinitis, chronic --  --    Allergy, Unspecified --  --    BPH without Urinary Obstruction --  --    Degenerative disc disease, lumbar 10/05/2016 --    GERD --  --    Gout 09/08/2015 --    High blood pressure --  --    Hypertension --  --    Seasonal allergies --  --    Vitamin D deficiency 09/08/2015 --          Past Surgical History  Procedure Name Date Notes   Colonoscopy 07/2014 Diverticulosis and hemorrhoids.         Medication List  Name Date Started Instructions   amlodipine 10 mg oral tablet 09/30/2019 TAKE ONE TABLET BY MOUTH DAILY for 90 days   aspirin 81 mg oral tablet,delayed release (DR/EC)  take 1 tablet (81 mg) by oral route once daily   doxazosin 8 mg oral tablet 09/30/2019 take 1 tablet (8 mg) by oral route once daily for 90 days   hydrochlorothiazide 25 mg oral tablet 09/30/2019  take 1 tablet (25 mg) by oral route once daily   losartan 50 mg oral tablet 09/30/2019 take 1 tablet (50 mg) by oral route once daily   omeprazole 20 mg oral capsule,delayed release(/EC) 09/30/2019 take 1 capsule (20 mg) by oral route once daily before a meal for 90 days   Singulair 10 mg oral tablet 09/30/2019 TAKE ONE TABLET BY MOUTH EVERY EVENING   Vitamin D3 2,000 unit oral capsule  take 1 capsule by oral route daily   Zyrtec 10 mg oral tablet 09/30/2019 TAKE ONE TABLET BY MOUTH DAILY         Allergy List  Allergen Name Date Reaction Notes   NO KNOWN DRUG ALLERGIES --  --  --    Molds --  --  --          Family Medical History  Disease Name Relative/Age Notes   Cancer, Unspecified Son/   Son   Renal Calculus Mother/   Mother         Social History  Finding Status Start/Stop Quantity Notes   Active but no formal exercise --  --/-- --  --    Alcohol Current some day --/-- --  04/30/2019 - 22   Alcohol Use Current some day --/-- --  rarely drinks, less than 1 drink per day, has been drinking for 31 or more years   lives with spouse --  --/-- --  --     --  --/-- --  --    . --  --/-- --  --    No known infection risk --  --/-- --  --    Recreational Drug Use Never --/-- --  no   Retired. --  --/-- --  --    Second hand smoke exposure Never 0/0 --  no   Tobacco Former 15/45 1 pack former smoker         Immunizations  NameDate Admin Mfg Trade Name Lot Number Route Inj VIS Given VIS Publication   Hepatitis A10/23/2020 SKB HAVRIX-ADULT [id number] IM LD 10/23/2020 07/28/2020   Comments:    Xbdjjjugr83/23/2019 [facility]C FLUZONE-HIGH DOSE [id number] IM RD 10/23/2019    Comments: tolerated well advised to wait for 15 minutes after injection   Nhaqhvjdt9388/23/2020 MSD PNEUMOVAX 23 [id number] IM LD 10/23/2020 04/24/2015   Comments:    Prevnar 1310/05/2016 [facility] PREVNAR 13 [id number] IM LD 10/05/2016 11/15/2015   Comments: tolerated well   Tdap07/01/2015 SKB BOOSTRIX  NE NE 09/04/2015 01/24/2012   Comments:          Review of  "Systems  · Constitutional  o Denies  o : fever, fatigue, weight loss, weight gain  · Cardiovascular  o Denies  o : lower extremity edema, claudication, chest pressure, palpitations  · Respiratory  o Denies  o : shortness of breath, wheezing, cough, hemoptysis, dyspnea on exertion  · Gastrointestinal  o Denies  o : nausea, vomiting, diarrhea, constipation, abdominal pain      Vitals  Date Time BP Position Site L\R Cuff Size HR RR TEMP (F) WT  HT  BMI kg/m2 BSA m2 O2 Sat FR L/min FiO2 HC       05/04/2021 07:40 /62 Sitting    53 - R   226lbs 6.4oz 5'  8\" 34.42 2.22 96 %            Physical Examination  · Constitutional  o Appearance  o : overweight, well developed, alert, in no acute distress  · Head and Face  o Head  o : normocephalic, atraumatic  · Neck  o Inspection/Palpation  o : normal appearance, no masses or tenderness, trachea midline  o Thyroid  o : gland size normal, nontender, no nodules or masses present on palpation  · Respiratory  o Respiratory Effort  o : breathing unlabored  o Inspection of Chest  o : chest rise symmetric bilaterally  o Auscultation of Lungs  o : clear to auscultation bilaterally throughout inspiration and expiration  · Cardiovascular  o Heart  o :   § Auscultation of Heart  § : regular rate and rhythm, no murmurs, gallops or rubs  o Peripheral Vascular System  o :   § Extremities  § : no edema  · Lymphatic  o Neck  o : no cervical lymphadenopathy, no supraclavicular lymphadenopathy  · Psychiatric  o Mood and Affect  o : mood normal, affect appropriate          Assessment  · Essential hypertension     401.9/I10  · Gout     274.9/M10.9  · Allergic rhinitis, chronic     477.9  · GERD     530.81  · BPH without Urinary Obstruction     600.00/N40.0      Plan  · Orders  o Uric Acid (Serum) (64028) - 401.9/I10 - 05/04/2021  o Free T4 (96309) - - 05/04/2021  o Physical, Primary Care Panel (CBC, CMP, Lipid, TSH) [facility] (12047, 37680, 91568, 86023) - - 05/04/2021  o Urinalysis with Reflex " Microscopy ([facility]) (67490) - - 05/04/2021  o Vitamin D (25-Hydroxy) Level (84707) - - 05/04/2021  o ACO-39: Current medications updated and reviewed (, 1159F) - - 05/04/2021  · Medications  o amlodipine 10 mg oral tablet   SIG: TAKE ONE TABLET BY MOUTH DAILY for 90 days   DISP: (90) Tablet with 3 refills  Refilled on 05/04/2021     o doxazosin 8 mg oral tablet   SIG: take 1 tablet (8 mg) by oral route once daily for 90 days   DISP: (90) Tablet with 3 refills  Refilled on 05/04/2021     o hydrochlorothiazide 25 mg oral tablet   SIG: take 1 tablet (25 mg) by oral route once daily   DISP: (90) Tablet with 3 refills  Refilled on 05/04/2021     o losartan 50 mg oral tablet   SIG: take 1 tablet (50 mg) by oral route once daily   DISP: (90) Tablet with 3 refills  Refilled on 05/04/2021     o omeprazole 20 mg oral capsule,delayed release(DR/EC)   SIG: take 1 capsule (20 mg) by oral route once daily before a meal for 90 days   DISP: (90) Capsule with 3 refills  Refilled on 05/04/2021     o Singulair 10 mg oral tablet   SIG: TAKE ONE TABLET BY MOUTH EVERY EVENING   DISP: (90) Tablet with 3 refills  Refilled on 05/04/2021     o Zyrtec 10 mg oral tablet   SIG: TAKE ONE TABLET BY MOUTH DAILY   DISP: (90) Tablet with 3 refills  Refilled on 05/04/2021     o Medications have been Reconciled  o Transition of Care or Provider Policy  · Instructions  o Patient advised to monitor blood pressure (B/P) at home and journal readings. Patient informed that a B/P reading at home of more than 130/80 is considered hypertension. For readings greater qrbz010/90 or higher patient is advised to follow up in the office with readings for management. Patient advised to limit sodium intake.  o Take all medications as prescribed/directed.  o Patient instructed/educated on their diet and exercise program.  o Patient was educated/instructed on their diagnosis, treatment and medications prior to discharge from the clinic today.  o Patient counseled to  reduce calorie intake.  o Patient was instructed to exercise regularly.  o Discussed Covid-19 precautions including, but not limited to, social distancing, avoid touching your face, and hand washing.   · Disposition  o Call or Return if symptoms worsen or persist.  o F/U in 6 months  o Care Transition            Electronically Signed by: Maciej Casas PA-C -Author on May 4, 2021 08:36:51 AM

## 2021-05-15 VITALS — RESPIRATION RATE: 14 BRPM | HEIGHT: 68 IN | BODY MASS INDEX: 33.55 KG/M2 | WEIGHT: 221.37 LBS

## 2021-05-15 VITALS — HEART RATE: 54 BPM | BODY MASS INDEX: 33.95 KG/M2 | WEIGHT: 224 LBS | OXYGEN SATURATION: 97 % | HEIGHT: 68 IN

## 2021-05-15 VITALS
SYSTOLIC BLOOD PRESSURE: 126 MMHG | WEIGHT: 221 LBS | HEART RATE: 50 BPM | DIASTOLIC BLOOD PRESSURE: 65 MMHG | BODY MASS INDEX: 33.49 KG/M2 | OXYGEN SATURATION: 98 % | HEIGHT: 68 IN

## 2021-05-15 VITALS
HEART RATE: 50 BPM | DIASTOLIC BLOOD PRESSURE: 66 MMHG | SYSTOLIC BLOOD PRESSURE: 133 MMHG | BODY MASS INDEX: 34.48 KG/M2 | WEIGHT: 227.5 LBS | OXYGEN SATURATION: 95 % | HEIGHT: 68 IN

## 2021-05-16 VITALS
WEIGHT: 226 LBS | HEART RATE: 62 BPM | BODY MASS INDEX: 35.47 KG/M2 | DIASTOLIC BLOOD PRESSURE: 65 MMHG | SYSTOLIC BLOOD PRESSURE: 140 MMHG | HEIGHT: 67 IN

## 2021-05-16 VITALS
WEIGHT: 225 LBS | HEART RATE: 63 BPM | BODY MASS INDEX: 35.31 KG/M2 | HEIGHT: 67 IN | SYSTOLIC BLOOD PRESSURE: 137 MMHG | DIASTOLIC BLOOD PRESSURE: 59 MMHG

## 2021-07-15 VITALS
OXYGEN SATURATION: 96 % | BODY MASS INDEX: 34.31 KG/M2 | WEIGHT: 226.37 LBS | DIASTOLIC BLOOD PRESSURE: 62 MMHG | HEIGHT: 68 IN | SYSTOLIC BLOOD PRESSURE: 147 MMHG | HEART RATE: 53 BPM

## 2023-02-03 RX ORDER — INFLUENZA A VIRUS A/MICHIGAN/45/2015 X-275 (H1N1) ANTIGEN (FORMALDEHYDE INACTIVATED), INFLUENZA A VIRUS A/SINGAPORE/INFIMH-16-0019/2016 IVR-186 (H3N2) ANTIGEN (FORMALDEHYDE INACTIVATED), INFLUENZA B VIRUS B/PHUKET/3073/2013 ANTIGEN (FORMALDEHYDE INACTIVATED), AND INFLUENZA B VIRUS B/MARYLAND/15/2016 BX-69A ANTIGEN (FORMALDEHYDE INACTIVATED) 60; 60; 60; 60 UG/.7ML; UG/.7ML; UG/.7ML; UG/.7ML
INJECTION, SUSPENSION INTRAMUSCULAR
Qty: 0.7 ML | Refills: 0 | OUTPATIENT
Start: 2023-02-03

## 2025-05-01 ENCOUNTER — TELEPHONE (OUTPATIENT)
Dept: SURGERY | Facility: CLINIC | Age: 75
End: 2025-05-01
Payer: MEDICARE

## 2025-05-01 NOTE — TELEPHONE ENCOUNTER
PT RECALL LETTER WAS RETURNED, CALLED PT AND UPDATED HIS ADDRESS, ASKED PT IF HER WANTED TO GO AHEAD AND SCHEDULE THE CONSULT, HE DID NOT WISH TO SCHEDULE AT THIS TIME, PLEASE ADVISE?